# Patient Record
Sex: FEMALE | Race: WHITE | URBAN - METROPOLITAN AREA
[De-identification: names, ages, dates, MRNs, and addresses within clinical notes are randomized per-mention and may not be internally consistent; named-entity substitution may affect disease eponyms.]

---

## 2017-07-14 ENCOUNTER — TELEPHONE (OUTPATIENT)
Dept: FAMILY MEDICINE CLINIC | Facility: CLINIC | Age: 22
End: 2017-07-14

## 2017-07-14 NOTE — TELEPHONE ENCOUNTER
Over the past three says had a fever, broke yesterday. Yesterday she noticed a rash developing on her legs, very small red bumps that do not itch.      PT states she took Tylenol for fever management and Benadryl to try to manage the rash, but it did not h

## 2017-07-14 NOTE — TELEPHONE ENCOUNTER
Suspect the rash is the result of the viral infection that triggered the fever. As long as not itching and fver resoving, recommend observation of rash, should resolve in 3-5 days.

## 2018-04-24 ENCOUNTER — TELEPHONE (OUTPATIENT)
Dept: FAMILY MEDICINE CLINIC | Facility: CLINIC | Age: 23
End: 2018-04-24

## 2018-04-24 DIAGNOSIS — R63.5 WEIGHT GAIN: Primary | ICD-10-CM

## 2018-04-24 NOTE — TELEPHONE ENCOUNTER
Patient calling stating she has a family history of hypothyroidism. Patient states she is having extreme fatigue/weight gain. Patient is wanting to know if she should have labs drawn or appt with Dr. Scott Han to discuss.  State okay to wait for Dr. Scott Han

## 2018-04-25 ENCOUNTER — TELEPHONE (OUTPATIENT)
Dept: FAMILY MEDICINE CLINIC | Facility: CLINIC | Age: 23
End: 2018-04-25

## 2018-04-25 NOTE — TELEPHONE ENCOUNTER
Lab orders for TSH, Free T4, CMP, and CBC entered per MD 4/24/18. Orders printed and left at  for pick. Left detailed message for pt, asked Mario Pretty to call back with any questions.

## 2018-04-25 NOTE — TELEPHONE ENCOUNTER
please download hard copy of BW orders for pt to  to take to [facility]  due to Michaela Mccabe coverage    OTW til 4/26

## 2018-05-14 ENCOUNTER — OFFICE VISIT (OUTPATIENT)
Dept: FAMILY MEDICINE CLINIC | Facility: CLINIC | Age: 23
End: 2018-05-14

## 2018-05-14 ENCOUNTER — TELEPHONE (OUTPATIENT)
Dept: FAMILY MEDICINE CLINIC | Facility: CLINIC | Age: 23
End: 2018-05-14

## 2018-05-14 VITALS
HEIGHT: 67.5 IN | HEART RATE: 116 BPM | DIASTOLIC BLOOD PRESSURE: 80 MMHG | SYSTOLIC BLOOD PRESSURE: 128 MMHG | RESPIRATION RATE: 16 BRPM | TEMPERATURE: 99 F | WEIGHT: 198.63 LBS | BODY MASS INDEX: 30.81 KG/M2

## 2018-05-14 DIAGNOSIS — F41.1 ANXIETY STATE: ICD-10-CM

## 2018-05-14 DIAGNOSIS — R53.83 FATIGUE, UNSPECIFIED TYPE: Primary | ICD-10-CM

## 2018-05-14 PROCEDURE — 99214 OFFICE O/P EST MOD 30 MIN: CPT | Performed by: FAMILY MEDICINE

## 2018-05-14 RX ORDER — NORETHINDRONE ACETATE AND ETHINYL ESTRADIOL 1MG-20(21)
1 KIT ORAL DAILY
COMMUNITY
Start: 2017-12-08 | End: 2018-08-01 | Stop reason: DRUGHIGH

## 2018-05-14 NOTE — TELEPHONE ENCOUNTER
Pt informed.     Future Appointments  Date Time Provider Janay Sullivan   5/14/2018 1:30 PM Edie Mendez MD [facility]

## 2018-05-14 NOTE — TELEPHONE ENCOUNTER
----- Message from Byron Koehler MD sent at 5/14/2018  9:50 AM CDT -----  Labs great  Will discuss at appt today  No changes in meds

## 2018-05-14 NOTE — PROGRESS NOTES
[facility]  PROGRESS NOTE  Chief Complaint:   Patient presents with:  Fatigue: constant fatigue last few months  Leg Pain: left leg pain/stiffness      HPI:   This is a 23year old female coming in for follow-up as she is back in the her with anxiety and she is trying to do the best she can with it.   Patient is also intermittently had lower pelvic pain that she talk with her gynecologist but was told many with cyst but nothing that she should worry about she is unsure where it sat relation -FREE T4 (FREE THYROXINE)   Result Value Ref Range   T4, FREE 1.2 0.8 - 1.4 ng/dL   -ASSAY, THYROID STIM HORMONE   Result Value Ref Range   TSH 1.84 mIU/L       Past Medical History:   Diagnosis Date   • Anxiety      No past surgical history on file.   So Denies allergic response, history of asthma, sneezing, hives, eczema or rhinitis.      EXAM:   /80   Pulse 116   Temp 98.5 °F (36.9 °C) (Temporal)   Resp 16   Ht 67.5\"   Wt 198 lb 9.6 oz   LMP 04/23/2018   BMI 30.65 kg/m²  Estimated body mass index i Patient reports low energy level and some difficulties with anxiety. Patient has a normal exam today and had normal labs done on Saturday. Will add vitamin D level see if this could be possible contributor factor to her low energy level.   Patient is enco

## 2018-05-14 NOTE — PATIENT INSTRUCTIONS
Daily multivitamin with iron  Continue, try to increase exercise routine  Await Vit D level  If symptoms do not improve consider change in oral contraceptive

## 2018-05-15 ENCOUNTER — TELEPHONE (OUTPATIENT)
Dept: FAMILY MEDICINE CLINIC | Facility: CLINIC | Age: 23
End: 2018-05-15

## 2018-05-15 DIAGNOSIS — E55.9 VITAMIN D DEFICIENCY: Primary | ICD-10-CM

## 2018-05-15 NOTE — TELEPHONE ENCOUNTER
----- Message from Ramos Clark MD sent at 5/15/2018  1:23 PM CDT -----  Vit D mildly decreased  Would benefit from 2000 units, 1 cap daily, available OTC  Recheck level end of summer, order placed

## 2018-07-31 LAB — VITAMIN D, 25-OH, TOTAL: 49 NG/ML (ref 30–100)

## 2018-08-01 ENCOUNTER — TELEPHONE (OUTPATIENT)
Dept: FAMILY MEDICINE CLINIC | Facility: CLINIC | Age: 23
End: 2018-08-01

## 2018-08-01 ENCOUNTER — OFFICE VISIT (OUTPATIENT)
Dept: FAMILY MEDICINE CLINIC | Facility: CLINIC | Age: 23
End: 2018-08-01
Payer: COMMERCIAL

## 2018-08-01 VITALS
DIASTOLIC BLOOD PRESSURE: 64 MMHG | BODY MASS INDEX: 28.54 KG/M2 | SYSTOLIC BLOOD PRESSURE: 116 MMHG | HEART RATE: 78 BPM | WEIGHT: 184 LBS | TEMPERATURE: 98 F | HEIGHT: 67.25 IN

## 2018-08-01 DIAGNOSIS — Z01.419 ENCOUNTER FOR GYNECOLOGICAL EXAMINATION: ICD-10-CM

## 2018-08-01 DIAGNOSIS — Z00.00 ENCOUNTER FOR HEALTH MAINTENANCE EXAMINATION IN ADULT: Primary | ICD-10-CM

## 2018-08-01 PROCEDURE — 99395 PREV VISIT EST AGE 18-39: CPT | Performed by: NURSE PRACTITIONER

## 2018-08-01 PROCEDURE — 88175 CYTOPATH C/V AUTO FLUID REDO: CPT | Performed by: NURSE PRACTITIONER

## 2018-08-01 PROCEDURE — 90471 IMMUNIZATION ADMIN: CPT | Performed by: NURSE PRACTITIONER

## 2018-08-01 PROCEDURE — 90715 TDAP VACCINE 7 YRS/> IM: CPT | Performed by: NURSE PRACTITIONER

## 2018-08-01 RX ORDER — NORETHINDRONE ACETATE/ETHINYL ESTRADIOL AND FERROUS FUMARATE 1.5-30(21)
1 KIT ORAL DAILY
COMMUNITY
Start: 2018-07-10 | End: 2018-08-01

## 2018-08-01 RX ORDER — NORETHINDRONE ACETATE/ETHINYL ESTRADIOL AND FERROUS FUMARATE 1.5-30(21)
1 KIT ORAL DAILY
Qty: 3 PACKAGE | Refills: 12 | Status: SHIPPED | OUTPATIENT
Start: 2018-08-01 | End: 2018-12-22

## 2018-08-01 NOTE — PATIENT INSTRUCTIONS
Pap smear obtained today–results should be back within a week. I would recommend having gonorrhea chlamydia screening once you get back to school as the results are not as accurate when there is a lot of blood.     Continue oral contraceptive pill–take at

## 2018-08-01 NOTE — PROGRESS NOTES
HPI:    Patient ID: Quiana Michaels is a 26year old female. HPI patient is here for her Pap and physical.  Patient states that 2 days ago she was traveling and did not take her birth control pills.   States she took 2 pills yesterday and 2 pills today, normal, normal heart sounds, intact distal pulses and normal pulses. No murmur heard. Pulses:       Dorsalis pedis pulses are 2+ on the right side, and 2+ on the left side.         Posterior tibial pulses are 2+ on the right side, and 2+ on the left irving diagnosis)  Encounter for gynecological examination      Orders Placed This Encounter      TdaP (Adacel, Boostrix) (65853) (DX V06.1/Z23)      ThinPrep PAP Smear      THINPREP PAP SMEAR ONLY    Meds This Visit:  Signed Prescriptions Disp Refills    Clois Brush

## 2018-08-01 NOTE — TELEPHONE ENCOUNTER
----- Message from Atiya Meza MD sent at 7/31/2018 12:52 PM CDT -----  Vit D level good  Continue current dose of Vit D  Recheck 1 year

## 2018-09-26 ENCOUNTER — TELEPHONE (OUTPATIENT)
Dept: FAMILY MEDICINE CLINIC | Facility: CLINIC | Age: 23
End: 2018-09-26

## 2018-09-26 NOTE — TELEPHONE ENCOUNTER
She can finish her current pill pack, then start on the 1.5/30 with a new 1–she should bring these pills back to the pharmacy so that she can get a refill of her current prescription not her old one.   Please make sure that patient is taking her pill the ex

## 2018-09-26 NOTE — TELEPHONE ENCOUNTER
Pt states that for the last 4 months her period has come a week early and lasted through the week it was supposed to come (2 week periods).  She just started a new pill pack 1.5 weeks ago and realized that the pharmacy gave her the old dose she was on this

## 2018-12-12 ENCOUNTER — TELEPHONE (OUTPATIENT)
Dept: FAMILY MEDICINE CLINIC | Facility: CLINIC | Age: 23
End: 2018-12-12

## 2018-12-12 NOTE — TELEPHONE ENCOUNTER
----- Message from Merlinda Brasil sent at 12/12/2018 10:19 AM CST -----  Returned call [phone] if patient can't answer the phone you can leave detailed voicemail

## 2018-12-12 NOTE — TELEPHONE ENCOUNTER
Please notify patient that she should return for a Pap smear since the one that was obtained on her menses was unsatisfactory. Please have her schedule.

## 2018-12-12 NOTE — TELEPHONE ENCOUNTER
Per patient request left detailed voicemail with Polina's recommendations    Jaylene Lilly, 12/12/18, 11:50 AM

## 2018-12-22 ENCOUNTER — OFFICE VISIT (OUTPATIENT)
Dept: FAMILY MEDICINE CLINIC | Facility: CLINIC | Age: 23
End: 2018-12-22
Payer: COMMERCIAL

## 2018-12-22 VITALS
BODY MASS INDEX: 28.13 KG/M2 | TEMPERATURE: 98 F | HEIGHT: 68 IN | RESPIRATION RATE: 18 BRPM | WEIGHT: 185.63 LBS | HEART RATE: 60 BPM | DIASTOLIC BLOOD PRESSURE: 68 MMHG | SYSTOLIC BLOOD PRESSURE: 122 MMHG

## 2018-12-22 DIAGNOSIS — Z00.00 ENCOUNTER FOR ROUTINE ADULT HEALTH EXAMINATION WITHOUT ABNORMAL FINDINGS: Primary | ICD-10-CM

## 2018-12-22 PROCEDURE — 87591 N.GONORRHOEAE DNA AMP PROB: CPT | Performed by: FAMILY MEDICINE

## 2018-12-22 PROCEDURE — 87491 CHLMYD TRACH DNA AMP PROBE: CPT | Performed by: FAMILY MEDICINE

## 2018-12-22 PROCEDURE — 69210 REMOVE IMPACTED EAR WAX UNI: CPT | Performed by: FAMILY MEDICINE

## 2018-12-22 PROCEDURE — 88175 CYTOPATH C/V AUTO FLUID REDO: CPT | Performed by: FAMILY MEDICINE

## 2018-12-22 PROCEDURE — 87510 GARDNER VAG DNA DIR PROBE: CPT | Performed by: FAMILY MEDICINE

## 2018-12-22 PROCEDURE — 87660 TRICHOMONAS VAGIN DIR PROBE: CPT | Performed by: FAMILY MEDICINE

## 2018-12-22 PROCEDURE — 87480 CANDIDA DNA DIR PROBE: CPT | Performed by: FAMILY MEDICINE

## 2018-12-22 PROCEDURE — 99214 OFFICE O/P EST MOD 30 MIN: CPT | Performed by: FAMILY MEDICINE

## 2018-12-22 RX ORDER — NORETHINDRONE ACETATE/ETHINYL ESTRADIOL AND FERROUS FUMARATE 1.5-30(21)
1 KIT ORAL DAILY
Qty: 3 PACKAGE | Refills: 12 | Status: SHIPPED | OUTPATIENT
Start: 2018-12-22 | End: 2020-01-29

## 2018-12-22 NOTE — PROGRESS NOTES
[facility] [place]  PROGRESS NOTE        HPI:   This is a 26year old female coming in for Patient presents with:  Pap  Ear Wax: flush    HPI  Came for physical in June but had her cycle and couldn't have pa pdone.  And her [place] [facility],      Received:            08/01/2018 07:23 PM                                 [address]                                                       First Screen:          Kaveh Avila Hematological: Negative. Psychiatric/Behavioral: Negative. All other systems reviewed and are negative.       EXAM:   /68 (BP Location: Right arm, Patient Position: Sitting, Cuff Size: adult)   Pulse 60   Temp 97.8 °F (36.6 °C) (Temporal)   Re VAGINITIS/VAGINOSIS, DNA PROBE  -     THINPREP PAP SMEAR B    Other orders  -     JENNIFER FE 1.5/30 1.5-30 MG-MCG Oral Tab; Take 1 tablet by mouth daily. No problem-specific Assessment & Plan notes found for this encounter.        future appointment:

## 2018-12-26 ENCOUNTER — TELEPHONE (OUTPATIENT)
Dept: FAMILY MEDICINE CLINIC | Facility: CLINIC | Age: 23
End: 2018-12-26

## 2018-12-26 NOTE — TELEPHONE ENCOUNTER
----- Message from PATRICE Espinosa sent at 12/26/2018  8:34 AM CST -----  Dr. Rigo Rowe is out of the office today. Please let patient know that her vaginal swab is negative for Gonorrhea and chlamydia, but positive for BV.  Please see if she would prefer

## 2018-12-27 RX ORDER — METRONIDAZOLE 500 MG/1
500 TABLET ORAL 2 TIMES DAILY
Qty: 20 TABLET | Refills: 0 | Status: SHIPPED | OUTPATIENT
Start: 2018-12-27 | End: 2019-06-13 | Stop reason: ALTCHOICE

## 2018-12-27 NOTE — TELEPHONE ENCOUNTER
Patient updated on results. Expresses understanding and will call if has any questions or concerns. Patient would like an oral medication sent to [facility].

## 2019-01-03 ENCOUNTER — TELEPHONE (OUTPATIENT)
Dept: FAMILY MEDICINE CLINIC | Facility: CLINIC | Age: 24
End: 2019-01-03

## 2019-01-03 NOTE — TELEPHONE ENCOUNTER
Patient advised that she can take the oral metronidazole while she is on her menses  Patient advised not to use tampons while being treated for BV    Patient expressed understanding    Saulo Sy, 01/03/19, 3:31 PM

## 2019-01-29 ENCOUNTER — TELEPHONE (OUTPATIENT)
Dept: FAMILY MEDICINE CLINIC | Facility: CLINIC | Age: 24
End: 2019-01-29

## 2019-01-29 NOTE — TELEPHONE ENCOUNTER
Informed pt that she needs to go to urgent care or ER immediately to r/o stroke. Pt is away at school in [place] she will go to urgent care or ER now. Pt is on BCP. Pt expressed understanding and thanks.

## 2019-01-29 NOTE — TELEPHONE ENCOUNTER
Patient says that part of her face won't move? Patients states that it is not numb, that when she smiles one side of her face doesn't smile all the way and her eye doesn't close all the way.  Patient states that she is out of state and is not able to come i

## 2019-02-05 ENCOUNTER — TELEPHONE (OUTPATIENT)
Dept: FAMILY MEDICINE CLINIC | Facility: CLINIC | Age: 24
End: 2019-02-05

## 2019-02-05 NOTE — TELEPHONE ENCOUNTER
See previous phone note. Patient will continue symptoms but is in [place] for school. Unsure who she should see out there as far as a Doctor or specialist.  Please advise.

## 2019-02-05 NOTE — TELEPHONE ENCOUNTER
Patient informed of the below recommendations. States she is still having all the same symptoms and nothing has changed since finishing the prescription given in the ER.   Patient states she is in [address] for school right now so she will look into seeing a prov

## 2019-02-05 NOTE — TELEPHONE ENCOUNTER
Re: has [address] - what specialists should she see? ?   She is still having the same issue      - Please leave detailed message on voicemail due to she will be at work

## 2019-02-06 NOTE — TELEPHONE ENCOUNTER
Recommend being seen at [facility]. I usually refer to PT for stimulation therapy to help facial droop recover quicker.   Pt can start gentle massage to weak side till seen

## 2019-02-06 NOTE — TELEPHONE ENCOUNTER
Informed pt of Dr. hCavez Bread recommendations. Pt is currently in [place] away at school and urgent care suggested that pt should see an ENT. Pt will schedule appt with ENT out in [place].

## 2019-06-13 NOTE — PROGRESS NOTES
Meggan Majano is a 26year old female. Patient presents with:  Depression  Anxiety      HPI:   Patient states that she is here today for evaluation of her depression/anxiety–feels it is more anxiety than depression.   Patient states she feels irritable a cough  CARDIOVASCULAR: denies complaints   GI: denies abdominal pain, nausea, vomiting, diarrhea   MUSCULOSKELETAL:  No arthralgias or myalgias  NEURO: denies headaches, denies dizziness  PSYCH: See HPI  EXAM:   /78 (BP Location: Right arm, Patient P

## 2019-06-19 ENCOUNTER — TELEPHONE (OUTPATIENT)
Dept: FAMILY MEDICINE CLINIC | Facility: CLINIC | Age: 24
End: 2019-06-19

## 2019-06-19 NOTE — TELEPHONE ENCOUNTER
copy of imm report from [facility]  mailed to her in [address] per request pending new employment

## 2019-11-27 ENCOUNTER — TELEPHONE (OUTPATIENT)
Dept: FAMILY MEDICINE CLINIC | Facility: CLINIC | Age: 24
End: 2019-11-27

## 2019-11-27 NOTE — TELEPHONE ENCOUNTER
Dr. Sarah Lopez is out of the office today. Keep going with the pack and use a back up method. May throw off by a day or can just miss the one day. May get menses a day early.

## 2019-11-27 NOTE — TELEPHONE ENCOUNTER
Pt states that she was on an airplane yesterday and dropped her bcp pill and could not find it. She took the pill for today but missed yesterday's pill. She states that she has enough pills in this pack left until Saturday.  She states that she is going

## 2020-01-08 RX ORDER — NORETHINDRONE ACETATE/ETHINYL ESTRADIOL AND FERROUS FUMARATE 1.5-30(21)
KIT ORAL
Qty: 84 TABLET | Refills: 9 | OUTPATIENT
Start: 2020-01-08

## 2020-01-08 NOTE — TELEPHONE ENCOUNTER
Patient states she has moved and is no longer being seen at our clinic. Patient states she is not needing a RF as she is having it filled elsewhere now. Will fill out change of PCP form.

## 2020-01-17 ENCOUNTER — TELEPHONE (OUTPATIENT)
Dept: FAMILY MEDICINE CLINIC | Facility: CLINIC | Age: 25
End: 2020-01-17

## 2020-01-29 NOTE — TELEPHONE ENCOUNTER
Please advise refill of Marino Fe.   Last Rx: 12/22/18    Future appt:    Last Appointment with provider:   12/22/18 for Physical  Last appointment at [facility]:  6/13/19 Luis Richardson for Depression  CHOLESTEROL, TOTAL (mg/dL)   Date Value   06/14/2019 206 (H)

## 2020-01-29 NOTE — TELEPHONE ENCOUNTER
looking for a 1 time refill on her luciano se 1.5/30, has a dr kyle.  In two weeks, had to reschedule past appointment due to the weather, is out of state and needs the refill to go to [address]

## 2020-01-30 RX ORDER — NORETHINDRONE ACETATE/ETHINYL ESTRADIOL AND FERROUS FUMARATE 1.5-30(21)
1 KIT ORAL DAILY
Qty: 1 PACKAGE | Refills: 0 | Status: SHIPPED | OUTPATIENT
Start: 2020-01-30

## 2020-02-05 LAB — VARICELLA-ZOSTER AB, IGG: 943.7

## 2020-10-13 ENCOUNTER — PATIENT MESSAGE (OUTPATIENT)
Dept: FAMILY MEDICINE CLINIC | Facility: CLINIC | Age: 25
End: 2020-10-13

## 2020-10-13 NOTE — TELEPHONE ENCOUNTER
From: Ambreen Huffman  To: PATRICE Eldridge  Sent: 10/13/2020 11:17 AM CDT  Subject: Non-Urgent Medical Question    Good morning! I am wondering if I am able to request an emotional support animal letter from you for my dog.  You were the person to

## 2021-02-26 NOTE — TELEPHONE ENCOUNTER
[facility] from [facility] [place], [place] sent a request for a copy of all of pt's medical records. This was sent to [employer].
